# Patient Record
Sex: FEMALE | Race: WHITE | URBAN - METROPOLITAN AREA
[De-identification: names, ages, dates, MRNs, and addresses within clinical notes are randomized per-mention and may not be internally consistent; named-entity substitution may affect disease eponyms.]

---
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
## 2021-02-22 NOTE — TELEPHONE ENCOUNTER
Stefan Lopez c/b [phone]  Stefan Lopez states:   - refill  Medication Epiduo Forte 0.3-2.5% Gel   - which every way is cheaper    - mail order or coupon local   - call to let them know which way we are sending medication refill  Please call to discuss thanks

## 2021-02-23 RX ORDER — ADAPALENE AND BENZOYL PEROXIDE 3; 25 MG/G; MG/G
GEL TOPICAL
Qty: 45 G | Refills: 3 | Status: SHIPPED | OUTPATIENT
Start: 2021-02-23 | End: 2021-11-18 | Stop reason: SDUPTHER

## 2021-02-23 NOTE — TELEPHONE ENCOUNTER
Called and spoke to patient's mother and informed her that at [address] [place] [address] Blvd would be anywhere from $0-$50 depending on insurance. She was agreeable to having rx sent there. Provided her with their information and she will call them tomorrow to set up payment and delivery. Please advise.

## 2021-05-11 RX ORDER — DROSPIRENONE AND ETHINYL ESTRADIOL 0.02-3(28)
KIT ORAL
Qty: 84 TABLET | Refills: 3 | Status: SHIPPED | OUTPATIENT
Start: 2021-05-11 | End: 2021-06-03 | Stop reason: SDUPTHER

## 2021-06-03 ENCOUNTER — OFFICE VISIT (OUTPATIENT)
Dept: DERMATOLOGY | Age: 21
End: 2021-06-03
Payer: COMMERCIAL

## 2021-06-03 VITALS — TEMPERATURE: 98 F

## 2021-06-03 DIAGNOSIS — D22.61 NEVUS OF RIGHT HAND: ICD-10-CM

## 2021-06-03 DIAGNOSIS — L70.0 ACNE VULGARIS: Primary | ICD-10-CM

## 2021-06-03 PROCEDURE — 99213 OFFICE O/P EST LOW 20 MIN: CPT | Performed by: DERMATOLOGY

## 2021-06-03 NOTE — PROGRESS NOTES
[facility]  Merline Mario MD  [phone]      Chaka Mann  2000    24 y.o. female     Date of Visit: 6/3/2021    Chief Complaint: acne    History of Present Illness:    1. She returns today to follow-up for inflammatory acne vulgaris of the face. She reports good control with spironolactone 50 mg daily, Bryan and Epiduo forte gel daily. She complains of some blackheads that she picks and expresses. 2.  Still has a history of a compound nevus on the dorsum of the right hand that was removed with a shave biopsy in the past.  It did recur when she is here for reevaluation. Review of Systems:  Neuro: No headache  Cardiovascular: no lightheadedness    Past Medical History, Family History, Surgical History, Medications and Allergies reviewed. History reviewed. No pertinent past medical history. Past Surgical History:   Procedure Laterality Date    APPENDECTOMY         No Known Allergies  Outpatient Medications Marked as Taking for the 6/3/21 encounter (Office Visit) with Francie Flores MD   Medication Sig Dispense Refill    drospirenone-ethinyl estradiol (BRYAN) 3-0.02 MG per tablet TAKE 1 TABLET DAILY 84 tablet 3    Adapalene-Benzoyl Peroxide (EPIDUO FORTE) 0.3-2.5 % GEL Apply to the face daily for acne. 45 g 3    triamcinolone (KENALOG) 0.1 % cream Apply to affected area twice daily on the arms for up to 2 weeks or until improved. 45 g 3    spironolactone (ALDACTONE) 50 MG tablet TAKE 1 TABLET DAILY 90 tablet 3    montelukast (SINGULAIR) 10 MG tablet Take 10 mg by mouth nightly           Physical Examination       Well appearing. 1.  Central face - few excoriations. No primary lesions visible today. 2.  Dorsum of the right hand - round smooth brown macule. Assessment and Plan     1. Acne vulgaris with excoriations-overall under good control    Continue Epiduo Forte gel daily. Continue Bryan. Continue spironolactone 50 mg daily.     Can consider light based acne device (has Neutrogena device at home). Avoid picking/excoriating skin. 2. Recurrent nevus of right hand - benign appearing    Reassurance. Return in about 6 months (around 12/3/2021).     --Jordin Payne MD

## 2021-06-04 RX ORDER — DROSPIRENONE AND ETHINYL ESTRADIOL 0.02-3(28)
KIT ORAL
Qty: 84 TABLET | Refills: 3 | Status: SHIPPED | OUTPATIENT
Start: 2021-06-04 | End: 2022-06-14

## 2021-11-17 ENCOUNTER — TELEPHONE (OUTPATIENT)
Dept: DERMATOLOGY | Age: 21
End: 2021-11-17

## 2021-11-17 NOTE — TELEPHONE ENCOUNTER
Tried calling patient's mother but voicemail was full. The cheapest place to get medication would be where they got it last time - [facility]mail) pharmacy. Max out of pocket cost - $60.    Ask if her if this is where she would like Dr Jonah Garcia to send it.

## 2021-11-17 NOTE — TELEPHONE ENCOUNTER
Re: Haroon Donahue refill sent to [facility], but [facility] wants to know the cheapest way, such as mail order or if you have coupons.      Please call mom [phone]

## 2021-11-18 RX ORDER — ADAPALENE AND BENZOYL PEROXIDE 3; 25 MG/G; MG/G
GEL TOPICAL
Qty: 45 G | Refills: 3 | Status: SHIPPED | OUTPATIENT
Start: 2021-11-18 | End: 2022-01-03 | Stop reason: SDUPTHER

## 2022-01-03 ENCOUNTER — OFFICE VISIT (OUTPATIENT)
Dept: DERMATOLOGY | Age: 22
End: 2022-01-03
Payer: COMMERCIAL

## 2022-01-03 VITALS — TEMPERATURE: 97.6 F

## 2022-01-03 DIAGNOSIS — L20.84 INTRINSIC ATOPIC DERMATITIS: ICD-10-CM

## 2022-01-03 DIAGNOSIS — L70.0 ACNE VULGARIS: Primary | ICD-10-CM

## 2022-01-03 PROCEDURE — 99214 OFFICE O/P EST MOD 30 MIN: CPT | Performed by: DERMATOLOGY

## 2022-01-03 RX ORDER — ADAPALENE AND BENZOYL PEROXIDE 3; 25 MG/G; MG/G
GEL TOPICAL
Qty: 45 G | Refills: 3 | Status: SHIPPED | OUTPATIENT
Start: 2022-01-03

## 2022-01-03 RX ORDER — SPIRONOLACTONE 50 MG/1
TABLET, FILM COATED ORAL
Qty: 90 TABLET | Refills: 3 | Status: SHIPPED | OUTPATIENT
Start: 2022-01-03

## 2022-01-03 RX ORDER — TRIAMCINOLONE ACETONIDE 1 MG/G
CREAM TOPICAL
Qty: 80 G | Refills: 1 | Status: SHIPPED | OUTPATIENT
Start: 2022-01-03

## 2022-01-03 NOTE — PROGRESS NOTES
LUE, RLE and LLE. Well appearing. 1.  Lower cheeks with several erythematous papules. 2.  Left wrist and legs with scattered excoriations and scaly erythematous papules. Assessment and Plan     1. Acne vulgaris - mild inflammatory, remains improved    Discussed likely worsening of acne off of Aracely. Continue spironolactone 50 mg daily. Consider increase to 100 mg daily if she goes off of Aracely. Continue Epiduo Forte gel daily. 2. Intrinsic atopic dermatitis - mild    Triamcinolone 0.1% cream twice daily for up to 2 weeks or until improved. CeraVe cream 1 to 2 times daily. Return in about 6 months (around 7/3/2022).     --Giuliana Davalos MD

## 2022-06-14 RX ORDER — DROSPIRENONE AND ETHINYL ESTRADIOL 0.02-3(28)
KIT ORAL
Qty: 84 TABLET | Refills: 3 | Status: SHIPPED | OUTPATIENT
Start: 2022-06-14

## 2022-12-29 RX ORDER — SPIRONOLACTONE 50 MG/1
TABLET, FILM COATED ORAL
Qty: 90 TABLET | Refills: 0 | Status: SHIPPED | OUTPATIENT
Start: 2022-12-29

## 2022-12-29 NOTE — TELEPHONE ENCOUNTER
LOV: 1/2022    Tried calling patient to schedule yearly appointment but there was no answer and voicemail is not set up.

## 2022-12-30 NOTE — TELEPHONE ENCOUNTER
Tried calling patient to schedule yearly appointment but there was no answer and voicemail is not set up.

## 2023-03-29 RX ORDER — SPIRONOLACTONE 50 MG/1
TABLET, FILM COATED ORAL
Qty: 90 TABLET | Refills: 3 | OUTPATIENT
Start: 2023-03-29

## 2023-05-15 RX ORDER — DROSPIRENONE AND ETHINYL ESTRADIOL 0.02-3(28)
KIT ORAL
Qty: 84 TABLET | Refills: 3 | OUTPATIENT
Start: 2023-05-15

## 2023-05-15 NOTE — TELEPHONE ENCOUNTER
LOV: 1/3/2022    See previous refill encounters from 12/29/23 and 3/29/23 where I tried to contact patient to inform her that an appointment is needed. Patient needs to be seen before any refills can be sent. Tried calling patient but there was no answer and voicemail was full.

## 2023-10-20 ENCOUNTER — TELEPHONE (OUTPATIENT)
Dept: DERMATOLOGY | Age: 23
End: 2023-10-20

## 2023-10-20 NOTE — TELEPHONE ENCOUNTER
Pt is wanting a refill of Epiduo Forte. She is not taking birth control so she does not want the Spirolactone.  She just needs the [employer] sent to [facility]  [address] [facility]  Phone is [phone]  Fax [phone]    Phone [phone]

## 2023-10-24 ENCOUNTER — PATIENT MESSAGE (OUTPATIENT)
Dept: DERMATOLOGY | Age: 23
End: 2023-10-24

## 2023-10-25 RX ORDER — ADAPALENE AND BENZOYL PEROXIDE 3; 25 MG/G; MG/G
GEL TOPICAL
Qty: 45 G | Refills: 0 | Status: SHIPPED | OUTPATIENT
Start: 2023-10-25

## 2024-01-15 ENCOUNTER — OFFICE VISIT (OUTPATIENT)
Dept: DERMATOLOGY | Age: 24
End: 2024-01-15
Payer: COMMERCIAL

## 2024-01-15 DIAGNOSIS — L20.84 INTRINSIC ATOPIC DERMATITIS: ICD-10-CM

## 2024-01-15 DIAGNOSIS — L70.9 ADULT ACNE: Primary | ICD-10-CM

## 2024-01-15 PROCEDURE — 99214 OFFICE O/P EST MOD 30 MIN: CPT | Performed by: DERMATOLOGY

## 2024-01-15 RX ORDER — LEVONORGESTREL 52 MG/1
1 INTRAUTERINE DEVICE INTRAUTERINE ONCE
COMMUNITY

## 2024-01-15 RX ORDER — TACROLIMUS 1 MG/G
OINTMENT TOPICAL
Qty: 30 G | Refills: 4 | Status: SHIPPED | OUTPATIENT
Start: 2024-01-15

## 2024-01-15 RX ORDER — SPIRONOLACTONE 50 MG/1
50 TABLET, FILM COATED ORAL 2 TIMES DAILY
Qty: 180 TABLET | Refills: 1 | Status: SHIPPED | OUTPATIENT
Start: 2024-01-15

## 2024-01-16 ENCOUNTER — TELEPHONE (OUTPATIENT)
Dept: DERMATOLOGY | Age: 24
End: 2024-01-16

## 2024-01-16 NOTE — TELEPHONE ENCOUNTER
Submitted PA for Tacrolimus  Via CMM Key: [id number]   STATUS: Approved today  CaseId:[id number];Status:Approved;Review Type:Prior Auth;Coverage Start Date:12/17/2023;Coverage End Date:01/15/2025

## 2024-02-09 ENCOUNTER — PATIENT MESSAGE (OUTPATIENT)
Dept: DERMATOLOGY | Age: 24
End: 2024-02-09

## 2024-03-09 ENCOUNTER — PATIENT MESSAGE (OUTPATIENT)
Dept: DERMATOLOGY | Age: 24
End: 2024-03-09

## 2024-03-18 NOTE — TELEPHONE ENCOUNTER
Assessment complete. Discussed POC and answered all questions.    Patient scheduled for 4/24/24 at 3:15pm.

## 2024-04-24 ENCOUNTER — OFFICE VISIT (OUTPATIENT)
Dept: DERMATOLOGY | Age: 24
End: 2024-04-24
Payer: COMMERCIAL

## 2024-04-24 DIAGNOSIS — L70.9 ADULT ACNE: Primary | ICD-10-CM

## 2024-04-24 PROCEDURE — 99214 OFFICE O/P EST MOD 30 MIN: CPT | Performed by: DERMATOLOGY

## 2024-04-24 RX ORDER — SPIRONOLACTONE 50 MG/1
TABLET, FILM COATED ORAL
Qty: 270 TABLET | Refills: 2 | Status: SHIPPED | OUTPATIENT
Start: 2024-04-24

## 2024-04-24 RX ORDER — ADAPALENE GEL USP, 0.3% 3 MG/G
GEL TOPICAL
Qty: 45 G | Refills: 4 | Status: SHIPPED | OUTPATIENT
Start: 2024-04-24

## 2024-04-24 NOTE — PROGRESS NOTES
treatment goal    Increase dose of spironolactone to 150 mg daily.  Discussed potential risks/side effects.     Switch to adapalene 0.3% gel nightly.     Consider Winlevi if does not tolerate the higher dose of spironolactone.          Return in about 3 months (around 7/24/2024).    --Paulo Mccallum MD

## 2024-07-22 ENCOUNTER — OFFICE VISIT (OUTPATIENT)
Dept: DERMATOLOGY | Age: 24
End: 2024-07-22
Payer: COMMERCIAL

## 2024-07-22 DIAGNOSIS — L70.9 ADULT ACNE: Primary | ICD-10-CM

## 2024-07-22 DIAGNOSIS — L20.84 INTRINSIC ATOPIC DERMATITIS: ICD-10-CM

## 2024-07-22 PROCEDURE — 99214 OFFICE O/P EST MOD 30 MIN: CPT | Performed by: DERMATOLOGY

## 2024-07-22 NOTE — PROGRESS NOTES
[facility])       Physical Examination       Well appearing.    1.  Cheeks with a rare small erythematous papule.     2.  Anterior neck with ill defined scaly pink patches.        Assessment and Plan     1. Chronic adult inflammatory acne - much improved    Continue spironolactone 150 mg daily.     Continue adapalene 0.3% gel nightly.      2. Chronic atopic dermatitis with chronic neck involvement    Switch back to triamcinolone 0.1% cream twice daily for up to 2 weeks or until improved and then as needed for recurrences.    Consider Opzelura cream in the future for persistent disease.         Return in about 4 months (around 11/22/2024).    --Paulo Mccallum MD

## 2024-08-23 NOTE — TELEPHONE ENCOUNTER
Called patient @ [phone] regarding medication refills  A script with 4 refills was sent in on 4-2024  [facility]ill will be sent if any problems patient will contact the office

## 2024-08-28 RX ORDER — ADAPALENE GEL USP, 0.3% 3 MG/G
GEL TOPICAL
Qty: 45 G | Refills: 4 | Status: SHIPPED | OUTPATIENT
Start: 2024-08-28

## 2024-11-18 ENCOUNTER — TELEMEDICINE (OUTPATIENT)
Dept: DERMATOLOGY | Age: 24
End: 2024-11-18
Payer: COMMERCIAL

## 2024-11-18 DIAGNOSIS — L20.84 INTRINSIC ATOPIC DERMATITIS: Primary | ICD-10-CM

## 2024-11-18 DIAGNOSIS — L70.9 ADULT ACNE: ICD-10-CM

## 2024-11-18 PROCEDURE — 99214 OFFICE O/P EST MOD 30 MIN: CPT | Performed by: DERMATOLOGY

## 2024-11-18 RX ORDER — PIMECROLIMUS 10 MG/G
CREAM TOPICAL
Qty: 60 G | Refills: 2 | Status: SHIPPED | OUTPATIENT
Start: 2024-11-18

## 2024-11-18 RX ORDER — FLUOXETINE 10 MG/1
10 CAPSULE ORAL DAILY
COMMUNITY
Start: 2024-09-27

## 2024-11-18 NOTE — PROGRESS NOTES
2024    TELEHEALTH EVALUATION -- Audio/Visual    HPI:    Aleja Silverio (:  2000) has requested an audio/video evaluation for the following concern(s):    Acne and dermatitis    1.  She returns today to follow-up for chronic atopic dermatitis.  She reports continued involvement of her neck.  She reports temporary clearance with triamcinolone 0.1% cream but it quickly recurs after stopping use after 2 weeks.    Of note she previously experienced itching and irritation with the application of tacrolimus 0.1% ointment.    2.  She also returns today to follow-up for adult female acne and reports excellent control with the following regimen:    Spironolactone 150 mg daily  Adapalene 0.3% gel nightly  Blue light home device        Review of Systems  Cardiovascular: No lightheadedness    Prior to Visit Medications    Medication Sig Taking? Authorizing Provider   FLUoxetine (PROZAC) 10 MG capsule Take 1 capsule by mouth daily Yes America Carver MD   pimecrolimus (ELIDEL) 1 % cream Apply topically 2 times daily. Yes Paulo Mccallum MD   Adapalene (DIFFERIN) 0.3 % GEL Apply a pea sized amount to the face at bedtime. Yes Paulo Mccallum MD   spironolactone (ALDACTONE) 50 MG tablet Take 2 in the evening and 1 in the morning. Yes Paulo Mccallum MD   levonorgestrel (MIRENA, 52 MG,) IUD 52 mg 1 Intra Uterine Device by IntraUTERine route once Yes America Carver MD   tacrolimus (PROTOPIC) 0.1 % ointment Apply to affected areas twice daily until improved. Yes Paulo Mccallum MD   triamcinolone (KENALOG) 0.1 % cream Apply to affected areas on the extremities twice daily for up to 2 weeks or until improved. Yes Paulo Mccallum MD   montelukast (SINGULAIR) 10 MG tablet Take 1 tablet by mouth nightly Yes ProviderAmerica MD       Social History     Tobacco Use    Smoking status: Never    Smokeless tobacco: Never        No Known Allergies, No past medical history on file.,   Past Surgical History:

## 2024-11-22 ENCOUNTER — TELEPHONE (OUTPATIENT)
Dept: ADMINISTRATIVE | Age: 24
End: 2024-11-22

## 2024-11-22 ENCOUNTER — PATIENT MESSAGE (OUTPATIENT)
Dept: DERMATOLOGY | Age: 24
End: 2024-11-22

## 2024-11-22 NOTE — TELEPHONE ENCOUNTER
Submitted PA for PRMECROLIMUS  Via CMM Key: [id number]  STATUS: APPROVED.    If this requires a response please respond to the pool ( P [facility] MEDICATION PRE-AUTH).      Thank you please advise patient.

## 2025-01-21 RX ORDER — SPIRONOLACTONE 50 MG/1
TABLET, FILM COATED ORAL
Qty: 270 TABLET | Refills: 3 | Status: SHIPPED | OUTPATIENT
Start: 2025-01-21

## 2025-02-20 ENCOUNTER — PATIENT MESSAGE (OUTPATIENT)
Age: 25
End: 2025-02-20

## 2025-03-03 ENCOUNTER — OFFICE VISIT (OUTPATIENT)
Age: 25
End: 2025-03-03
Payer: COMMERCIAL

## 2025-03-03 DIAGNOSIS — B07.9 VERRUCA VULGARIS: ICD-10-CM

## 2025-03-03 DIAGNOSIS — L20.84 INTRINSIC ATOPIC DERMATITIS: Primary | ICD-10-CM

## 2025-03-03 PROCEDURE — 99214 OFFICE O/P EST MOD 30 MIN: CPT | Performed by: DERMATOLOGY

## 2025-03-03 PROCEDURE — 17110 DESTRUCTION B9 LES UP TO 14: CPT | Performed by: DERMATOLOGY

## 2025-03-04 ENCOUNTER — TELEPHONE (OUTPATIENT)
Age: 25
End: 2025-03-04

## 2025-03-04 NOTE — TELEPHONE ENCOUNTER
Submitted PA for Dupixent  Via [facility] Key: [id number] STATUS: PENDING.    Follow up done daily; if no decision with in three days we will refax.  If another three days goes by with no decision will call the insurance for status.

## 2025-03-05 ENCOUNTER — PATIENT MESSAGE (OUTPATIENT)
Age: 25
End: 2025-03-05

## 2025-03-05 NOTE — TELEPHONE ENCOUNTER
The medication was DENIED; DENIAL letter is uploaded to [employer].    Generic Denial: Auto response per portal. No letter generated. please see note below      Note :    CaseId:[id number];Status:Cancelled;Explanation:Other. ADDITIONAL FILLS ARE NOT COVERED MEMBER [facility];     If you want an APPEAL; please note in this encounter what new information you would like to APPEAL with.  Once complete route back to PA POOL.    If this requires a response please respond to the pool ( P [facility] MEDICATION PRE-AUTH).      Thank you please advise patient.

## 2025-04-30 DIAGNOSIS — L20.84 INTRINSIC ATOPIC DERMATITIS: ICD-10-CM

## 2025-04-30 NOTE — TELEPHONE ENCOUNTER
Incoming fax from [facility] requesting 90 day supply of Dupixent?  Last OV and refill 3/2025 + 3 refills  F/U appt 7/2/25

## 2025-07-02 ENCOUNTER — OFFICE VISIT (OUTPATIENT)
Age: 25
End: 2025-07-02
Payer: COMMERCIAL

## 2025-07-02 DIAGNOSIS — D48.5 NEOPLASM OF UNCERTAIN BEHAVIOR OF SKIN: ICD-10-CM

## 2025-07-02 DIAGNOSIS — B07.9 VERRUCA VULGARIS: ICD-10-CM

## 2025-07-02 DIAGNOSIS — L20.84 INTRINSIC ATOPIC DERMATITIS: Primary | ICD-10-CM

## 2025-07-02 PROCEDURE — 17110 DESTRUCTION B9 LES UP TO 14: CPT | Performed by: DERMATOLOGY

## 2025-07-02 PROCEDURE — 99213 OFFICE O/P EST LOW 20 MIN: CPT | Performed by: DERMATOLOGY

## 2025-07-02 PROCEDURE — 11104 PUNCH BX SKIN SINGLE LESION: CPT | Performed by: DERMATOLOGY

## 2025-07-02 NOTE — PATIENT INSTRUCTIONS
Biopsy Wound Care Instructions    Keep the bandage in place for 24 hours.   Cleanse the wound with mild soapy water daily  Gently dry the area.  Apply Vaseline or petroleum jelly to the wound using a cotton tipped applicator.  Cover with a clean bandage.  Repeat this process until the biopsy site is healed.  If you had stitches placed, continue treating the site until the stitches are removed. Remember to make an appointment to return to have your stitches removed by our staff.  You may shower and bathe as usual.       ** Biopsy results generally take around 7 business days to come back.  If you have not heard from us by then, please call the office at [phone].    *Please note that biopsy results are released to both the patient and physician at the same time in "[email or web address].  Please allow time for your physician to review the results.  One of our staff members will reach out to you with the results and plan.

## 2025-07-02 NOTE — PROGRESS NOTES
[facility]  Paulo Mccallum MD  [phone]      Aleja Silverio  2000    25 y.o. female     Date of Visit: 7/2/2025    Chief Complaint: eczema, moles    History of Present Illness:    1.  She returns today to follow-up for previously moderate to severe chronic atopic dermatitis with severe involvement of the face and neck.  She reports marked improvement since starting Dupixent about 3 to 4 months ago.  She reports her skin has cleared.  She will get occasional minor flareups on the neck that quickly resolve.    2.  She reports a recurrent wart on the right thumb tip.     3.  She also reports a recurrent mole on the dorsal surface of the right hand that continues to enlarge.      Review of Systems:  Gen: Feels well, good sense of health.    Past Medical History, Family History, Surgical History, Medications and Allergies reviewed.    History reviewed. No pertinent past medical history.  Past Surgical History:   Procedure Laterality Date    APPENDECTOMY         No Known Allergies  Outpatient Medications Marked as Taking for the 7/2/25 encounter (Office Visit) with Paulo Mccallum MD   Medication Sig Dispense Refill    dupilumab (DUPIXENT) 300 MG/2ML SOAJ injection Inject 300 mg SC every 2 weeks for atopic dermatitis. 4 mL 3    spironolactone (ALDACTONE) 50 MG tablet TAKE 1 TABLET IN THE MORNING AND 2 TABLETS IN THE EVENING 270 tablet 3    FLUoxetine (PROZAC) 10 MG capsule Take 1 capsule by mouth daily      pimecrolimus (ELIDEL) 1 % cream Apply topically 2 times daily. 60 g 2    Adapalene (DIFFERIN) 0.3 % GEL Apply a pea sized amount to the face at bedtime. 45 g 4    levonorgestrel (MIRENA, 52 MG,) IUD 52 mg 1 Intra Uterine Device by IntraUTERine route once      triamcinolone (KENALOG) 0.1 % cream Apply to affected areas on the extremities twice daily for up to 2 weeks or until improved. 80 g 1    montelukast (SINGULAIR) 10 MG tablet Take 1 tablet by mouth nightly           Physical

## 2025-07-07 ENCOUNTER — RESULTS FOLLOW-UP (OUTPATIENT)
Age: 25
End: 2025-07-07

## 2025-07-07 LAB
DERMATOLOGY PATHOLOGY REPORT: NORMAL
DERMATOLOGY PATHOLOGY REPORT: NORMAL

## 2025-08-11 DIAGNOSIS — L20.84 INTRINSIC ATOPIC DERMATITIS: ICD-10-CM

## 2025-08-11 RX ORDER — DUPILUMAB 300 MG/2ML
INJECTION, SOLUTION SUBCUTANEOUS
Qty: 12 ML | Refills: 3 | Status: ACTIVE | OUTPATIENT
Start: 2025-08-11

## 2025-08-19 ENCOUNTER — PATIENT MESSAGE (OUTPATIENT)
Age: 25
End: 2025-08-19

## 2025-08-24 RX ORDER — DOXYCYCLINE HYCLATE 50 MG/1
50 CAPSULE ORAL 2 TIMES DAILY
Qty: 60 CAPSULE | Refills: 0 | Status: SHIPPED | OUTPATIENT
Start: 2025-08-24